# Patient Record
(demographics unavailable — no encounter records)

---

## 2024-12-23 NOTE — PHYSICAL EXAM
[No Acute Distress] : no acute distress [Well Nourished] : well nourished [Well Developed] : well developed [Normal Oropharynx] : normal oropharynx [Normal Appearance] : normal appearance [No Neck Mass] : no neck mass [Normal Rate/Rhythm] : normal rate/rhythm [Normal S1, S2] : normal s1, s2 [No Murmurs] : no murmurs [No Resp Distress] : no resp distress [No Abnormalities] : no abnormalities [Benign] : benign [Not Tender] : not tender [Normal Gait] : normal gait [No Clubbing] : no clubbing [No Cyanosis] : no cyanosis [No Edema] : no edema [FROM] : FROM [Normal Color/ Pigmentation] : normal color/ pigmentation [No Focal Deficits] : no focal deficits [Oriented x3] : oriented x3 [Normal Affect] : normal affect [TextBox_2] : coughing [TextBox_68] : squeak in inspiratory/expiratory on left side [TextBox_89] : gravid

## 2024-12-23 NOTE — HISTORY OF PRESENT ILLNESS
[Never] : never [TextBox_4] : 30 year old patient presents for evaluation of  chronic cough, mucus in morning, otherwise dry.  States has had for at least 20 years.  She has been given albuterol MDI and ICS LABA without much benefit.  States throat itches.   States it is not worse.  No recent URI.   She did have COVID-19 infection in past.   States has not had CXR.   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  No exposure to chemicals, dust, asbestos, mold        ALLERGY:   NKDA   environmental/seasonal allergy:  occasional dust       Review of Systems:   No rash, skin problems No dry eyes no mouth ulcers no sinusitis, sinus infections, nasal obstruction no dysphagia no dry mouth   no arthritis no joint aches no joint swelling    no obstructive airway disease  no pneumonia no wheeze no lung cancer   no CAD no MI no chest pain no murmur no CHF no HTN no edema   no peptic ulcer or gastritis no GERD no abdominal pain no liver disease   no Diabetes no thyroid disease no hyperlipidemia     no bleeding   no DVT or PE   no kidney disease   no stroke no seizure

## 2024-12-23 NOTE — DISCUSSION/SUMMARY
[FreeTextEntry1] : 30 year old patient presents for evaluation of  chronic cough, mucus in morning, otherwise dry.  States has had for at least 20 years.  She has been given albuterol MDI and ICS LABA without much benefit.  States throat itches.   States it is not worse.  No recent URI.   She did have COVID-19 infection in past.   States has not had CXR.   No GERD  She is 34 weeks pregnant   She has mild squeak/wheeze on exam  PLAN  trial Airsupra   2 puffs BID  guaifenesin DM 10 ml twice daily as needed  return for PFT  defer CXR until after pregnancy  Blood allergy testing  Reasses  Further recommendations based on results.  Total time spent : 40 minutes Including: Preparation prior to visit - Reviewing prior record, results of tests and Consultation Reports as applicable Conducting an appropriate H & P during today's encounter  reviewing all available CT chest exams.  demonstrating images to pt as appropriate Counseling patient  Note completion  med renewal discussing differential diagnosis   Scott Suarez MD